# Patient Record
Sex: MALE | Race: WHITE
[De-identification: names, ages, dates, MRNs, and addresses within clinical notes are randomized per-mention and may not be internally consistent; named-entity substitution may affect disease eponyms.]

---

## 2018-02-27 ENCOUNTER — HOSPITAL ENCOUNTER (EMERGENCY)
Dept: HOSPITAL 17 - PHEFT | Age: 53
Discharge: HOME | End: 2018-02-27
Payer: SELF-PAY

## 2018-02-27 VITALS
DIASTOLIC BLOOD PRESSURE: 83 MMHG | HEART RATE: 78 BPM | SYSTOLIC BLOOD PRESSURE: 151 MMHG | RESPIRATION RATE: 18 BRPM | OXYGEN SATURATION: 95 % | TEMPERATURE: 97.8 F

## 2018-02-27 VITALS — BODY MASS INDEX: 25.38 KG/M2 | WEIGHT: 187.39 LBS | HEIGHT: 72 IN

## 2018-02-27 DIAGNOSIS — S39.012A: Primary | ICD-10-CM

## 2018-02-27 DIAGNOSIS — N20.0: ICD-10-CM

## 2018-02-27 LAB
COLOR UR: (no result)
GLUCOSE UR STRIP-MCNC: (no result) MG/DL
HGB UR QL STRIP: (no result)
KETONES UR STRIP-MCNC: (no result) MG/DL
LEUKOCYTE ESTERASE UR QL STRIP: (no result) /HPF (ref 0–5)
NITRITE UR QL STRIP: (no result)
SP GR UR STRIP: 1 (ref 1–1.03)
SQUAMOUS #/AREA URNS HPF: (no result) /HPF (ref 0–5)
URINE LEUKOCYTE ESTERASE: (no result)

## 2018-02-27 PROCEDURE — 96372 THER/PROPH/DIAG INJ SC/IM: CPT

## 2018-02-27 PROCEDURE — 99284 EMERGENCY DEPT VISIT MOD MDM: CPT

## 2018-02-27 PROCEDURE — 81001 URINALYSIS AUTO W/SCOPE: CPT

## 2018-02-27 PROCEDURE — 74176 CT ABD & PELVIS W/O CONTRAST: CPT

## 2018-02-27 NOTE — RADRPT
EXAM DATE/TIME:  02/27/2018 09:50 

 

[facility] COMPARISON:     

No previous studies available for comparison.

 

 

INDICATIONS :     

Bilateral flank pain for two weeks.

                  

 

ORAL CONTRAST:      

No oral contrast ingested.

                  

 

RADIATION DOSE:     

15.69 CTDIvol (mGy) 

 

 

MEDICAL HISTORY :     

None  

 

SURGICAL HISTORY :      

None. 

 

ENCOUNTER:      

Initial

 

ACUITY:      

2 weeks

 

PAIN SCALE:      

6/10

 

LOCATION:       

Bilateral flank 

 

TECHNIQUE:     

Volumetric scanning of the abdomen and pelvis was performed.  Using automated exposure control and ad
justment of the mA and/or kV according to patient size, radiation dose was kept as low as reasonably 
achievable to obtain optimal diagnostic quality images.  DICOM format image data is available electro
nically for review and comparison.  

 

FINDINGS:     

 

LOWER LUNGS:     

The visualized lower lungs are clear.

 

LIVER:     

Homogeneous density without lesion.  There is no dilation of the biliary tree.  No calcified gallston
es.

 

SPLEEN:     

Normal size without lesion.

 

PANCREAS:     

Within normal limits. 

 

KIDNEYS:     

Normal in size and shape.  There is no mass or hydronephrosis. There is a 5 x 4 mm nonobstructing sto
ne in the left lower pole collecting system. No ureteral stones are present.

 

ADRENAL GLANDS:     

Within normal limits.

 

VASCULAR:     

There is no aortic aneurysm. There is mild atherosclerotic disease.

 

BOWEL/MESENTERY:     

The stomach, small bowel, and colon demonstrate no acute abnormality.  There is no free intraperitone
al air or fluid. There is mild sigmoid diverticulosis.

 

ABDOMINAL WALL:     

Within normal limits.

 

RETROPERITONEUM:     

There is no lymphadenopathy.

 

BLADDER:     

No wall thickening or mass.

 

REPRODUCTIVE:     

Within normal limits.

 

INGUINAL:     

There is no lymphadenopathy or hernia.

 

MUSCULOSKELETAL:     

There are mild degenerative changes of the lumbar spine.

 

CONCLUSION:     

1. There is a 5 mm nonobstructing left lower pole renal stone. No other renal stones are present and 
there are no ureteral stones or signs of urinary obstruction.

2. Nonacute findings include mild atherosclerotic disease and mild sigmoid diverticulosis.

 

 

 

 Donavon Sheth MD on February 27, 2018 at 10:11           

Board Certified Radiologist.

 This report was verified electronically.

## 2018-02-27 NOTE — PD
HPI


Chief Complaint:  Musculoskeletal Complaint


Time Seen by Provider:  09:18


Travel History


International Travel<30 days:  No


Contact w/Intl Traveler<30days:  No


Traveled to known affect area:  No





History of Present Illness


HPI


51-year-old male that presents to the ED for evaluation of bilateral flank 

pain.  Per patient she's had this for 2-3 weeks.  Per patient is not getting 

better.  Per patient he comes and goes but more present than not.  Per patient 

the pain is on both flanks.  Per patient his urine has been darker than usual.  

Denies any other symptoms.  Per patient does get worse with certain motions.  

Sitting and standing makes it worse.  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 

work construction.  He denies any particular injury to her about.  States the 

pain gets to be 7 out of 10.  Has been taking anything for this.  Has not seen 

anybody for this.  No allergies to medication.  No other medical issues at this 

time.  No numbness, tingling, weakness.  No bowel movement issues.  No saddle 

anesthesia like symptoms.





PFSH


Past Medical History


Hx Anticoagulant Therapy:  No


Cancer:  No


Cardiovascular Problems:  No


Chemotherapy:  No


Cerebrovascular Accident:  No


Diabetes:  No


Diminished Hearing:  No


Hepatitis:  No


Hiatal Hernia:  No


Hypertension:  No


Respiratory:  No


Thyroid Disease:  No


Influenza Vaccination:  Yes





Past Surgical History


Abdominal Surgery:  No


Cardiac Surgery:  No


Ear Surgery:  No


Endocrine Surgery:  No


Eye Surgery:  No


Genitourinary Surgery:  No


Neurologic Surgery:  No


Oral Surgery:  Yes (TONSILLECTOMY)


Pacemaker:  No


Thoracic Surgery:  No


Other Surgery:  Yes (VASECTOMY)





Social History


Alcohol Use:  Yes (OCC)


Tobacco Use:  No


Substance Use:  No





Allergies-Medications


(Allergen,Severity, Reaction):  


Coded Allergies:  


     *MDRO Multi-Drug Resistant Organism (Verified  Adverse Reaction, Unknown, 2 /27/18)


 MRSA knee 9/2015.


Reported Meds & Prescriptions





Reported Meds & Active Scripts


Active


Robaxin (Methocarbamol) 500 Mg Tab 500 Mg PO TID


Diclofenac Sodium DR (Diclofenac Sodium) 75 Mg Tabdr 75 Mg PO BID PRN








Review of Systems


Except as stated in HPI:  all other systems reviewed are Neg





Physical Exam


Narrative


GENERAL: 


SKIN: Warm and dry.


HEAD: Atraumatic. Normocephalic. 


EYES: Pupils equal and round. No scleral icterus. No injection or drainage. 


ENT: No nasal bleeding or discharge.  Mucous membranes pink and moist.


NECK: Trachea midline. No JVD. 


CARDIOVASCULAR: Regular rate and rhythm.  


RESPIRATORY: No accessory muscle use. Clear to auscultation. Breath sounds 

equal bilaterally. 


GASTROINTESTINAL: Abdomen soft, non-tender, nondistended. Hepatic and splenic 

margins not palpable. 


MUSCULOSKELETAL: Extremities without clubbing, cyanosis, or edema. No obvious 

deformities.  Patient has reproducible pain on the flanks bilaterally.  No 

obvious CVA tenderness however.  Most of the pain appears to be in the lumbar 

musculature bilaterally.  No spinous processes pain noted.  No obvious 

deformity noted.  Straight leg test negative bilaterally.


NEUROLOGICAL: Awake and alert. No obvious cranial nerve deficits.  Motor 

grossly within normal limits. Five out of 5 muscle strength in the arms and 

legs.  Normal speech.


PSYCHIATRIC: Appropriate mood and affect; insight and judgment normal.





Data


Data


Last Documented VS





Vital Signs








  Date Time  Temp Pulse Resp B/P (MAP) Pulse Ox O2 Delivery O2 Flow Rate FiO2


 


2/27/18 08:48 97.8 78 18 151/83 (105) 95   








Orders





 Orders


Urinalysis - C+S If Indicated (2/27/18 09:22)


Ct Abd/Pel W/O Iv Contrast (2/27/18 )


Ketorolac Inj (Toradol Inj) (2/27/18 09:30)


Methocarbamol (Robaxin) (2/27/18 09:30)


Ed Discharge Order (2/27/18 10:28)





Labs





Laboratory Tests








Test


  2/27/18


09:25


 


Urine Collection Type VOIDED 


 


Urine Color STRAW 


 


Urine Turbidity CLEAR 


 


Urine pH 6.0 


 


Urine Specific Gravity 1.005 


 


Urine Protein NEG mg/dL 


 


Urine Glucose (UA) NEG mg/dL 


 


Urine Ketones NEG mg/dL 


 


Urine Occult Blood NEG 


 


Urine Nitrite NEG 


 


Urine Bilirubin NEG 


 


Urine Leukocyte Esterase NEG 


 


Urine WBC 0-2 /hpf 


 


Urine Squamous Epithelial


Cells 0-1 /hpf 


 


 


Microscopic Urinalysis Comment


  CULT NOT


INDICATED











MDM


Medical Decision Making


Medical Screen Exam Complete:  Yes


Emergency Medical Condition:  Yes


Medical Record Reviewed:  Yes


Interpretation(s)


Urine did not show any sign of blood or bacteria or signs of infection at all.


CT of the abdomen and pelvis did show a left 0.5 cm kidney stone that is 

nonobstructing and is currently not passing.  Still on the kidney.  Otherwise 

negative exam other than some arthritis to the lower back.


Differential Diagnosis


Back pain versus muscle strain versus kidney stone versus UTI versus 

pyelonephritis


Narrative Course


51-year-old male that presents to the ED for evaluation of flank pain.  Patient 

was properly examined and was found to have signs and symptoms consistent 

appears to be likely muscle skeletal pain.  Patient does work construction and 

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  He denies any 

particular injury.  Pain is reproducible with range of motion.  Wife and 

patient are concerned more about infection as patient apparently has changed 

color and wife has had some issues with her kidneys.  Urine was done.  CT was 

ordered.  CT and urine were negative for infection but does appear to have a 

left kidney stone that appears to be nonobstructing at this time.  Unclear if 

this is the source of the pain as patient has bilateral flank pain.  I do 

suspect this is more muscular than kidney.  We'll treat with anti-inflammatory 

and pain medication.  Given note for work.  Told to apply warm compresses to 

the back.  Follow with PCP.  See ED worsening symptoms.





Diagnosis





 Primary Impression:  


 Lumbar strain


 Qualified Codes:  S39.012A - Strain of muscle, fascia and tendon of lower back

, initial encounter


 Additional Impression:  


 Kidney stone on left side


Patient Instructions:  General Instructions


Departure Forms:  Tests/Procedures, Work Release   Enter return to work date:  

Mar 3, 2018





***Additional Instructions:  


Take medications as prescribed.


Follow-up with PCP.


See ED for any worsening symptoms.


Do not drink or drive while taking pain medication.


Apply ice or heat as needed for pain


***Med/Other Pt SpecificInfo:  Prescription(s) given


Scripts


Methocarbamol (Robaxin) 500 Mg Tab


500 MG PO TID for Muscle Spasm, #20 TAB 0 Refills


   Prov: Deon Kenney MD         2/27/18 


Diclofenac Sodium DR (Diclofenac Sodium DR) 75 Mg Tabdr


75 MG PO BID Y for PAIN SCALE 1 TO 10, #20 TAB 0 Refills


   Prov: Deon Kenney MD         2/27/18


Disposition:  01 DISCHARGE HOME


Condition:  Stable











Dwain Luis Feb 27, 2018 10:34